# Patient Record
Sex: FEMALE | Race: WHITE | NOT HISPANIC OR LATINO
[De-identification: names, ages, dates, MRNs, and addresses within clinical notes are randomized per-mention and may not be internally consistent; named-entity substitution may affect disease eponyms.]

---

## 2023-08-31 PROBLEM — Z00.00 ENCOUNTER FOR PREVENTIVE HEALTH EXAMINATION: Status: ACTIVE | Noted: 2023-08-31

## 2023-09-01 ENCOUNTER — APPOINTMENT (OUTPATIENT)
Dept: OTOLARYNGOLOGY | Facility: CLINIC | Age: 28
End: 2023-09-01
Payer: COMMERCIAL

## 2023-09-01 ENCOUNTER — NON-APPOINTMENT (OUTPATIENT)
Age: 28
End: 2023-09-01

## 2023-09-01 DIAGNOSIS — S09.90XA UNSPECIFIED INJURY OF HEAD, INITIAL ENCOUNTER: ICD-10-CM

## 2023-09-01 DIAGNOSIS — R43.8 OTHER DISTURBANCES OF SMELL AND TASTE: ICD-10-CM

## 2023-09-01 DIAGNOSIS — F32.89 OTHER SPECIFIED DEPRESSIVE EPISODES: ICD-10-CM

## 2023-09-01 DIAGNOSIS — Z78.9 OTHER SPECIFIED HEALTH STATUS: ICD-10-CM

## 2023-09-01 DIAGNOSIS — Z72.0 TOBACCO USE: ICD-10-CM

## 2023-09-01 DIAGNOSIS — H93.291 OTHER ABNORMAL AUDITORY PERCEPTIONS, RIGHT EAR: ICD-10-CM

## 2023-09-01 DIAGNOSIS — Z86.59 PERSONAL HISTORY OF OTHER MENTAL AND BEHAVIORAL DISORDERS: ICD-10-CM

## 2023-09-01 PROCEDURE — 99203 OFFICE O/P NEW LOW 30 MIN: CPT | Mod: 25

## 2023-09-01 PROCEDURE — 92567 TYMPANOMETRY: CPT

## 2023-09-01 PROCEDURE — 92557 COMPREHENSIVE HEARING TEST: CPT

## 2023-09-01 PROCEDURE — 31231 NASAL ENDOSCOPY DX: CPT

## 2023-09-01 RX ORDER — DEXTROAMPHETAMINE SULFATE, DEXTROAMPHETAMINE SACCHARATE, AMPHETAMINE SULFATE AND AMPHETAMINE ASPARTATE 5; 5; 5; 5 MG/1; MG/1; MG/1; MG/1
20 CAPSULE, EXTENDED RELEASE ORAL
Refills: 0 | Status: ACTIVE | COMMUNITY

## 2023-09-01 RX ORDER — BUPROPION HYDROCHLORIDE 300 MG/1
300 TABLET, EXTENDED RELEASE ORAL
Refills: 0 | Status: ACTIVE | COMMUNITY

## 2023-09-01 RX ORDER — BUSPIRONE HYDROCHLORIDE 5 MG/1
5 TABLET ORAL
Refills: 0 | Status: ACTIVE | COMMUNITY

## 2023-09-01 RX ORDER — PREDNISONE 10 MG/1
10 TABLET ORAL
Qty: 12 | Refills: 0 | Status: ACTIVE | COMMUNITY
Start: 2023-09-01 | End: 1900-01-01

## 2023-09-01 NOTE — HISTORY OF PRESENT ILLNESS
[de-identified] : TRANG CALLES is a 28 year old patient Here for loss of smell and possible hearing loss.  She fell down the stairs on August 11.  She hit the back of her head.  She did not have loss of consciousness.  She was evaluated at [facility] in the [place].  She had a CT scan.  She does not have the results with her.  She has had muffled hearing in the right ear with some fullness.  She initially had disequilibrium for a few days.  She has had bleeding from the ear.  That has stopped.  She has no tinnitus, dizziness, otalgia, or otorrhea.  She also noticed decrease in her sense of smell following the trauma.  She does not have significant nasal congestion or obstruction.  She did not have a preceding upper respiratory tract infection or allergies.  She said COVID testing was negative.  No history of recurrent ear infections or prior otologic surgery She has some history of noise exposure She has not had a recent audiogram  She has mild nasal congestion but no history of recurrent sinusitis or allergies She does not smoke but she does vape

## 2023-09-01 NOTE — REASON FOR VISIT
[Initial Evaluation] : an initial evaluation for [Hearing Loss] : hearing loss [FreeTextEntry2] : loss of smell

## 2023-09-01 NOTE — ASSESSMENT
[FreeTextEntry1] : She had head trauma on August 11.  She has had right abnormal auditory perception with fullness and possible hearing loss.  Her ears were normal on exam.  Audiogram was also normal.  She also has a history of hyposmia/loss of smell since the trauma.  Nasal endoscopy was unremarkable.  She does not have a history of recurrent sinusitis or sinus disease.  Smell test showed decreased sense of smell  PLAN  -findings and management options discussed in detail with the patient.  -good aural hygiene -avoid using cotton swabs in the ears -wax removal drops as needed.  -noise precautions -monitor hearing -I recommended a tiral of Flonase.  -The patient will try a steroid taper.  I reviewed some of the associated risks including the risk of mood changes, GI complications and bone issues/fractures. They should take the medication with food, take pepcid, avoid drinking alcohol and avoid strenuous exercise. - Olfactory training. She  may smell strong herbs and spices in the morning and before bed to stimulate the sense of smell.  Literature given.  -I asked her to obtain her CT scan results. I recommended neurology evaluation -follow up in 3-4 weeks.  -call and return earlier if any concerns.

## 2023-09-01 NOTE — PHYSICAL EXAM
[TextEntry] : PHYSICAL EXAM  General: The patient was alert, oriented and in no distress. Voice was clear.  Face: The patient had no facial asymmetry or mass. The skin was unremarkable.  Ears: Hearing normal to conversational voice External ears were normal without deformity. Ear canals were clear. No cerumen or inflammation Tympanic membranes were intact and normal. No perforation or effusion. mobile  Nose:  The external nose had no significant deformity.  There was no facial tenderness.  On anterior rhinoscopy, the nasal mucosa was normal.   The anterior septum was grossly midline. There were no visualized polyps, purulence  or masses.   Oral cavity: Oral mucosa- normal. Oral and base of tongue- clear and without mass. Gingival and buccal mucosa- moist and without lesions. Palate- the palate moved well. There was no cleft palate. There appeared to be good salivary flow.   Oral cavity/oropharynx- no pus, erythema or mass  Neck:  The neck was symmetrical. The parotid and submandibular glands were normal without masses. The trachea was midline and there was no unusual crepitus. Thyroid was smooth and nontender and no masses were palpated. No masses  Lymphatics: Cervical adenopathy- none.   SMELL TEST 3/12 CORRECT WHICH IS ABNORMAL FOR AGE

## 2023-09-22 ENCOUNTER — APPOINTMENT (OUTPATIENT)
Dept: OTOLARYNGOLOGY | Facility: CLINIC | Age: 28
End: 2023-09-22